# Patient Record
(demographics unavailable — no encounter records)

---

## 2024-11-04 NOTE — PHYSICAL EXAM
[Alert] : alert [Healthy Appearing] : healthy appearing [Sclera] : the sclera and conjunctiva were normal [Normal Appearance] : the appearance of the neck was normal [No Respiratory Distress] : no respiratory distress [Auscultation Breath Sounds / Voice Sounds] : lungs were clear to auscultation bilaterally [Normal S1, S2] : normal S1 and S2 [Bowel Sounds] : normal bowel sounds [Abdomen Tenderness] : non-tender [No CVA Tenderness] : no CVA  tenderness [Abnormal Walk] : normal gait [] : no rash [No Focal Deficits] : no focal deficits [Oriented To Time, Place, And Person] : oriented to person, place, and time

## 2024-11-04 NOTE — REVIEW OF SYSTEMS
[As Noted in HPI] : as noted in HPI [Fever] : no fever [Sore Throat] : no sore throat [Chest Pain] : no chest pain [SOB on Exertion] : no shortness of breath during exertion [Rash] : no rash [Skin Wound] : no skin wound [Dizziness] : no dizziness [Muscle Weakness] : no muscle weakness [Easy Bruising] : no tendency for easy bruising

## 2024-11-04 NOTE — ASSESSMENT
[FreeTextEntry1] : 1. acute diarrhea during COVID, patient clinically improved, diarrhea resolving  plan check stool cultures labs today  2. chronic gas/bloatin  plan; low FODMAP diet probiotics gas x as needed labs for celiac

## 2024-11-04 NOTE — HISTORY OF PRESENT ILLNESS
[FreeTextEntry1] : 34 yo male with c/o diarrhea for 2 weeks, patient reports 7bms daily, watery, brown, improving. Yesterday 3 bms not  watery, formed brown , no brpbr, no melena. no n/v. no gerd. no weight loss. tolerating po. no sick contacts. Patient had COVID 3 weeks ago, diarrhea occurred during covid. no recent antibiotics, no recent travel. Patient reports gas/bloating.

## 2025-06-12 NOTE — PHYSICAL EXAM
[Normal] : affect was normal and insight and judgment were intact [de-identified] : area of hyperpigmentation

## 2025-06-12 NOTE — HISTORY OF PRESENT ILLNESS
[FreeTextEntry1] : annual [de-identified] : 37 yo M with eczema presents for annual.  rash inner thigh starting in Feb. Itchy at times.